# Patient Record
Sex: MALE | Race: WHITE
[De-identification: names, ages, dates, MRNs, and addresses within clinical notes are randomized per-mention and may not be internally consistent; named-entity substitution may affect disease eponyms.]

---

## 2017-10-04 ENCOUNTER — HOSPITAL ENCOUNTER (INPATIENT)
Dept: HOSPITAL 84 - D.ER | Age: 37
LOS: 1 days | Discharge: HOME | DRG: 918 | End: 2017-10-05
Attending: FAMILY MEDICINE | Admitting: FAMILY MEDICINE
Payer: COMMERCIAL

## 2017-10-04 VITALS — BODY MASS INDEX: 22.9 KG/M2

## 2017-10-04 DIAGNOSIS — T42.4X2A: ICD-10-CM

## 2017-10-04 DIAGNOSIS — K75.9: ICD-10-CM

## 2017-10-04 DIAGNOSIS — E86.0: ICD-10-CM

## 2017-10-04 DIAGNOSIS — Z72.0: ICD-10-CM

## 2017-10-04 DIAGNOSIS — T43.622A: Primary | ICD-10-CM

## 2017-10-04 DIAGNOSIS — F13.229: ICD-10-CM

## 2017-10-04 DIAGNOSIS — K21.9: ICD-10-CM

## 2017-10-04 DIAGNOSIS — F15.229: ICD-10-CM

## 2017-10-04 LAB
ALBUMIN SERPL-MCNC: 3.7 G/DL (ref 3.4–5)
ALP SERPL-CCNC: 42 U/L (ref 46–116)
ALT SERPL-CCNC: 70 U/L (ref 10–68)
AMORPHOUS SEDIMENT: (no result) /LPF
AMPHETAMINES UR QL SCN: POSITIVE QUAL
ANION GAP SERPL CALC-SCNC: 13.5 MMOL/L (ref 8–16)
APAP SERPL-MCNC: 0 UG/ML (ref 10–30)
APPEARANCE UR: CLEAR
BACTERIA #/AREA URNS HPF: (no result) /HPF
BARBITURATES UR QL SCN: NEGATIVE QUAL
BASOPHILS NFR BLD AUTO: 0.3 % (ref 0–2)
BENZODIAZ UR QL SCN: POSITIVE QUAL
BILIRUB SERPL-MCNC: 0.5 MG/DL (ref 0.2–1.3)
BILIRUB SERPL-MCNC: NEGATIVE MG/DL
BUN SERPL-MCNC: 34 MG/DL (ref 7–18)
BZE UR QL SCN: NEGATIVE QUAL
CALCIUM SERPL-MCNC: 9.1 MG/DL (ref 8.5–10.1)
CANNABINOIDS UR QL SCN: NEGATIVE QUAL
CHLORIDE SERPL-SCNC: 106 MMOL/L (ref 98–107)
CO2 SERPL-SCNC: 26.6 MMOL/L (ref 21–32)
COLOR UR: YELLOW
CREAT SERPL-MCNC: 1 MG/DL (ref 0.6–1.3)
EOSINOPHIL NFR BLD: 1.9 % (ref 0–7)
ERYTHROCYTE [DISTWIDTH] IN BLOOD BY AUTOMATED COUNT: 11.8 % (ref 11.5–14.5)
ETHANOL SERPL-MCNC: 1 MG/DL (ref 0–10)
GLOBULIN SER-MCNC: 3.8 G/L
GLUCOSE SERPL-MCNC: 94 MG/DL (ref 74–106)
GLUCOSE SERPL-MCNC: NEGATIVE MG/DL
HCT VFR BLD CALC: 37.5 % (ref 42–54)
HGB BLD-MCNC: 13.3 G/DL (ref 13.5–17.5)
IMM GRANULOCYTES NFR BLD: 0.2 % (ref 0–5)
KETONES UR STRIP-MCNC: (no result) MG/DL
LYMPHOCYTES NFR BLD AUTO: 14.6 % (ref 15–50)
MAGNESIUM SERPL-MCNC: 2.2 MG/DL (ref 1.8–2.4)
MCH RBC QN AUTO: 32 PG (ref 26–34)
MCHC RBC AUTO-ENTMCNC: 35.5 G/DL (ref 31–37)
MCV RBC: 90.4 FL (ref 80–100)
MONOCYTES NFR BLD: 10.8 % (ref 2–11)
NEUTROPHILS NFR BLD AUTO: 72.2 % (ref 40–80)
NITRITE UR-MCNC: NEGATIVE MG/ML
OPIATES UR QL SCN: NEGATIVE QUAL
OSMOLALITY SERPL CALC.SUM OF ELEC: 292 MOSM/KG (ref 275–300)
PCP UR QL SCN: NEGATIVE QUAL
PH UR STRIP: 5 [PH] (ref 5–6)
PLATELET # BLD: 229 10X3/UL (ref 130–400)
PMV BLD AUTO: 9.7 FL (ref 7.4–10.4)
POTASSIUM SERPL-SCNC: 3.1 MMOL/L (ref 3.5–5.1)
PROT SERPL-MCNC: 7.5 G/DL (ref 6.4–8.2)
PROT UR-MCNC: NEGATIVE MG/DL
RBC # BLD AUTO: 4.15 10X6/UL (ref 4.2–6.1)
SODIUM SERPL-SCNC: 143 MMOL/L (ref 136–145)
SP GR UR STRIP: 1.03 (ref 1–1.02)
UROBILINOGEN UR-MCNC: NORMAL MG/DL
WBC # BLD AUTO: 11 10X3/UL (ref 4.8–10.8)

## 2017-10-05 VITALS — SYSTOLIC BLOOD PRESSURE: 113 MMHG | DIASTOLIC BLOOD PRESSURE: 76 MMHG

## 2017-10-05 VITALS — DIASTOLIC BLOOD PRESSURE: 81 MMHG | SYSTOLIC BLOOD PRESSURE: 124 MMHG

## 2017-10-05 VITALS — SYSTOLIC BLOOD PRESSURE: 121 MMHG | DIASTOLIC BLOOD PRESSURE: 77 MMHG

## 2017-10-05 VITALS — DIASTOLIC BLOOD PRESSURE: 76 MMHG | SYSTOLIC BLOOD PRESSURE: 113 MMHG

## 2017-10-05 VITALS — DIASTOLIC BLOOD PRESSURE: 74 MMHG | SYSTOLIC BLOOD PRESSURE: 124 MMHG

## 2017-10-05 VITALS — DIASTOLIC BLOOD PRESSURE: 88 MMHG | SYSTOLIC BLOOD PRESSURE: 127 MMHG

## 2017-10-05 VITALS — DIASTOLIC BLOOD PRESSURE: 85 MMHG | SYSTOLIC BLOOD PRESSURE: 113 MMHG

## 2017-10-05 VITALS — SYSTOLIC BLOOD PRESSURE: 110 MMHG | DIASTOLIC BLOOD PRESSURE: 90 MMHG

## 2017-10-05 VITALS — DIASTOLIC BLOOD PRESSURE: 87 MMHG | SYSTOLIC BLOOD PRESSURE: 120 MMHG

## 2017-10-05 VITALS — DIASTOLIC BLOOD PRESSURE: 58 MMHG | SYSTOLIC BLOOD PRESSURE: 94 MMHG

## 2017-10-05 NOTE — NUR
DISCUSSED DISCHARGE INSTRUCTION WITH PT AND PT'S FATHER. THEY VOICED
UNDERSTANDING. NO S/S OF DISTRESS NOTED. PT REPORTS HE HAS ALL BELONGINGS.
DRESSED. LEFT FA PIV D/C'D WITH CATH TIP INTACT. PT TOLERATED WELL.

## 2017-10-05 NOTE — NUR
NUTRITION F/U
CHART REVIEWED. PT AT LOW NUTRITIONAL RISK. CHANGED DIET TO REG AS NO CURRENT
INDICATION FOR AHA DIET.
RD FOLLOWING

## 2017-10-05 NOTE — NUR
ASSESSMENT COMPLETED. PT RESTING IN BED WITH EYES CLOSED. NO NEEDS AT THIS
TIME. CALL LIGHT WITHIN REACH. VSS.

## 2017-10-05 NOTE — NUR
Is the patient Alert and Oriented? Yes  0
* How many steps to enter\exit or inside your home? 0  0
* PCP [facility]: Rosamaria Roberts  0
* Pharmacy [facility] [address]  0
* Preadmission Environment Home with Family  0
* ADLs Independent  0
* Equipment None  0
* List name and contact numbers for known caregivers / representatives who
currently or will assist patient after discharge: PARENTS: AGUSTO GUILLERMO
[phone]  0
* Community resources currently utilized None  0
* Additional services required to return to the preadmission environment? No
0
* Can the patient safely return to the preadmission environment? Yes  0
* Has this patient been hospitalized within the prior 30 days at any hospital?
No
 PATIENT IS EASILY ARROUSABLE AND ALERT. HE ANSWERS MY QUESTIONS WITHOUT
DIFFICULTY. PATIENT STATES HE WAS NOT TRYING TO HURT HIMSELF OR COMMIT
SUICIDE. HE STATES THAT HE HAD DONE SOME METH AND TOOK THE BENZO'S TO TRY TO
CALM HIMSELF DOWN. PATIENT STATES HE WAS LIVING AT HOME WITH HIS PARENTS BUT
RECENTLY MOVED OUT AND HAS BEEN STAYING WITH SOME FRIENDS. HIS PCP IS [facility], ROSAMARIA ROBERTS. PATIENT STATES HE GETS HIS MEDICATION FROM
[facility] [address]. PATIENT DENIES EVER HAVING HOME HEALTH AND DENIES USE OF
ANY DME. PATIENT STATES AT DISCHARGE HIS PARENTS WOULD DRIVE HIM HOME AND HE
HOPE HE WILL RETURN TO STAY WITH THEM. NO DISCHARGE NEEDS AT THIS TIME.

## 2017-10-05 NOTE — NUR
SPOKE WITH PATIENT REGARDING INPATIENT DRUG REHAB. HE STATES THAT HE HAS BEEN
TO [facility] IN THE PAST AND IS CURRENTLY ON SUBOXONE THAT HE GETS FROM
[facility]. PATIENT IS AWARE THAT HE WILL NEED TO CALL AND SPEAK TO [facility] HIMSELF TO SET UP HIS REHAB. HIS NURSE, ELISA STATES THAT SHE SPOKE
WITH PATIENTS FATHER AND HE IS WILLING TO COME PICK PATIENT UP AND LET PATIENT
STAY WITH THEM UNTIL HE CAN GET INTO [facility] WHICH WILL BE 24-48 HOURS
AFTER HE CALLS THEM AND PROVIDES HIS INFORMATION. PATIENT WILL BE DISCHARGED
HOME.

## 2017-10-05 NOTE — NUR
ASSESSMENT COMPLETE. S1S2. NSR SHOWING ON MONITOR. RR EQUAL AND UNLABORED.
SCABS/SORES GENERALIZED. SCAB/SORE TO RIGHT HEAD. PT STATES HX OF STAPH, MRSA,
AND POSSIBLE HEP C. STATES PREVIOUS AND CURRENT IV AMPHETAMINES; STATES WAS
KICKED OUT OF HOUSE, NOW HOMELESS, HASN'T SLEEP IN 8 DAYS AND NEEDED SLEEP;
WAS IRRITABLE.  WERE CALLED BY [facility], PT TRANSFERRED BY EMS; PER PT.
BOWEL SOUNDS ACTIVE X4. PERRLA. PT RESTLESS, COOPERATIVE, AND CALM BEHAVIOR.
PLEASANT CONVERSATION; RESPECTFUL.

## 2017-10-05 NOTE — NUR
PT CALLED [facility] AND GAVE THEIR ADMISSIONS DEPARTMENT HIS INFORMATION. WILL
HAVE TO FOLLOW UP ONCE DISCHARGED FROM HOSPITAL. PT'S FATHER CALLED AND
NOTIFIED OF PT'S DISHARGE WITH INTENT TO FOLLOW UP WITH [facility].

## 2017-10-06 NOTE — CN
PATIENT NAME:GILDA GUILLERMO                                MEDICAL RECORD: [id number]
: 76                                              LOCATION:[id number]
ADMIT DATE: 10/04/17                                       ACCOUNT: [id number]
CONSULTING PHYSICIAN:    NELLY PENNINGTON MD             
                                               
REFERRING PHYSICIAN:     EARNEST FERREIRA MD               
 
 
DATE OF CONSULTATION:  10/05/2017
 
PSYCHIATRIC CONSULTATION
 
IDENTIFYING DATA:  The patient is 37 years old and he is admitted to the
hospital on a voluntary basis.
 
CHIEF COMPLAINT:  Polysubstance abuse.
 
HISTORY OF PRESENT ILLNESS:  The patient has been using methamphetamine and
Xanax alternately for about a week.  He does not report any thoughts of harming
himself or others and does not report taking an overdose.  He was simply on an
8-day "meth run."  Adding to this problem; however, the patient is currently on
parole for drug-related offenses and is supposed to report to his 
this week.  He is denying suicidality or psychotic symptoms.  He is obviously
tired because he has not been sleeping appropriately.  The Xanax has been
something he has just been taking after he has used a great deal of
methamphetamine and this sort of a pattern that is characteristic of individuals
who abuse methamphetamine.  They use methamphetamine for a day or two and then
in order to calm down, relax, or even go to sleep, they will take Xanax.  The
primary drug of abuse though is certainly the methamphetamine, which the patient
admits to being his drug of choice.
 
MENTAL STATUS EXAMINATION:  The patient is awake, alert, and oriented to person,
place, time, and situation.  His mood is flat.  His affect is appropriate. 
Thought processes are goal directed.  Memory, concentration, and abstraction
abilities are intact.  He denies that he would seek to harm himself or others as
well as overt psychotic symptoms.
 
ASSESSMENT:  Methamphetamine abuse.
 
PLAN:  At this time, the patient is appropriate for inpatient residential
substance abuse treatment, which he says he is willing to consider.  I think
that in addition to this, it may not be necessary that he may likely be revoked
by his  and obviously he will be in a structured environment in
[facility] and will not be able to use drugs there and they also will provide some
group therapy and counseling for him.  I do not think he is acutely dangerous in
some direct way except for the fact that he uses methamphetamine, which
obviously is a foolish and dangerous thing to do, but he simply is out of
control because of his addiction.  Residential treatment is very appropriate and
I see no contraindications to him being placed in residential treatment.  He has
been to residential treatment before and it may be difficult to find a place
that will accept him again.
 
TRANSINT:[id number] Voice Confirmation ID: [id number] DOCUMENT ID: [id number]
 
 
 
CONSULT REPORT                                 [id number]    GILDA GUILLERMO, NELLY RAGSDALE             
 
 
 
Electronically Signed by NELLY PENNINGTON on 10/06/17 at 1709
 
 
 
 
 
 
 
 
 
 
 
 
 
 
 
 
 
 
 
 
 
 
 
 
 
 
 
 
 
 
 
 
 
 
 
 
 
 
 
 
 
CC:                                                             [id number]
DICTATION DATE: 10/05/17 1332     :     10/05/17 1428      DIS IN  
                                                                      10/05/17
[facility]                                          
[address] [place] [address]

## 2017-12-12 ENCOUNTER — HOSPITAL ENCOUNTER (EMERGENCY)
Dept: HOSPITAL 84 - D.ER | Age: 37
Discharge: HOME | End: 2017-12-12
Payer: COMMERCIAL

## 2017-12-12 DIAGNOSIS — F17.200: ICD-10-CM

## 2017-12-12 DIAGNOSIS — R19.7: ICD-10-CM

## 2017-12-12 DIAGNOSIS — L70.9: Primary | ICD-10-CM

## 2018-08-06 ENCOUNTER — HOSPITAL ENCOUNTER (EMERGENCY)
Dept: HOSPITAL 84 - D.ER | Age: 38
Discharge: HOME | End: 2018-08-06
Payer: COMMERCIAL

## 2018-08-06 VITALS — BODY MASS INDEX: 20.78 KG/M2 | HEIGHT: 69 IN | WEIGHT: 140.29 LBS

## 2018-08-06 VITALS — SYSTOLIC BLOOD PRESSURE: 140 MMHG | DIASTOLIC BLOOD PRESSURE: 80 MMHG

## 2018-08-06 DIAGNOSIS — G40.909: ICD-10-CM

## 2018-08-06 DIAGNOSIS — M79.671: ICD-10-CM

## 2018-08-06 DIAGNOSIS — M79.672: Primary | ICD-10-CM

## 2018-08-06 DIAGNOSIS — F17.200: ICD-10-CM

## 2018-08-07 ENCOUNTER — HOSPITAL ENCOUNTER (EMERGENCY)
Dept: HOSPITAL 84 - D.ER | Age: 38
Discharge: HOME | End: 2018-08-07
Payer: COMMERCIAL

## 2018-08-07 VITALS — SYSTOLIC BLOOD PRESSURE: 125 MMHG | DIASTOLIC BLOOD PRESSURE: 72 MMHG

## 2018-08-07 VITALS
HEIGHT: 69 IN | BODY MASS INDEX: 17.77 KG/M2 | WEIGHT: 120 LBS | HEIGHT: 69 IN | BODY MASS INDEX: 17.77 KG/M2 | WEIGHT: 120 LBS

## 2018-08-07 DIAGNOSIS — F17.200: ICD-10-CM

## 2018-08-07 DIAGNOSIS — L03.114: ICD-10-CM

## 2018-08-07 DIAGNOSIS — G40.909: ICD-10-CM

## 2018-08-07 DIAGNOSIS — F15.129: Primary | ICD-10-CM

## 2018-08-07 LAB
ALBUMIN SERPL-MCNC: 4.2 G/DL (ref 3.4–5)
ALP SERPL-CCNC: 57 U/L (ref 46–116)
ALT SERPL-CCNC: 118 U/L (ref 10–68)
AMPHETAMINES UR QL SCN: POSITIVE QUAL
ANION GAP SERPL CALC-SCNC: 14.6 MMOL/L (ref 8–16)
APPEARANCE UR: (no result)
BACTERIA #/AREA URNS HPF: (no result) /HPF
BARBITURATES UR QL SCN: NEGATIVE QUAL
BASOPHILS NFR BLD AUTO: 0.1 % (ref 0–2)
BENZODIAZ UR QL SCN: NEGATIVE QUAL
BILIRUB SERPL-MCNC: 1.56 MG/DL (ref 0.2–1.3)
BILIRUB SERPL-MCNC: NEGATIVE MG/DL
BUN SERPL-MCNC: 46 MG/DL (ref 7–18)
BZE UR QL SCN: NEGATIVE QUAL
CALCIUM SERPL-MCNC: 9 MG/DL (ref 8.5–10.1)
CANNABINOIDS UR QL SCN: NEGATIVE QUAL
CHLORIDE SERPL-SCNC: 100 MMOL/L (ref 98–107)
CO2 SERPL-SCNC: 26.1 MMOL/L (ref 21–32)
COLOR UR: YELLOW
CREAT SERPL-MCNC: 1.1 MG/DL (ref 0.6–1.3)
EOSINOPHIL NFR BLD: 0.1 % (ref 0–7)
ERYTHROCYTE [DISTWIDTH] IN BLOOD BY AUTOMATED COUNT: 12.2 % (ref 11.5–14.5)
ETHANOL SERPL-MCNC: 0 MG/DL (ref 0–10)
GLOBULIN SER-MCNC: 4 G/L
GLUCOSE SERPL-MCNC: 100 MG/DL (ref 74–106)
GLUCOSE SERPL-MCNC: NEGATIVE MG/DL
HCT VFR BLD CALC: 39.1 % (ref 42–54)
HGB BLD-MCNC: 14.2 G/DL (ref 13.5–17.5)
IMM GRANULOCYTES NFR BLD: 0.3 % (ref 0–5)
KETONES UR STRIP-MCNC: (no result) MG/DL
LYMPHOCYTES NFR BLD AUTO: 19.3 % (ref 15–50)
MCH RBC QN AUTO: 32.6 PG (ref 26–34)
MCHC RBC AUTO-ENTMCNC: 36.3 G/DL (ref 31–37)
MCV RBC: 89.7 FL (ref 80–100)
MONOCYTES NFR BLD: 11.1 % (ref 2–11)
NEUTROPHILS NFR BLD AUTO: 69.1 % (ref 40–80)
NITRITE UR-MCNC: NEGATIVE MG/ML
OPIATES UR QL SCN: NEGATIVE QUAL
OSMOLALITY SERPL CALC.SUM OF ELEC: 285 MOSM/KG (ref 275–300)
PCP UR QL SCN: NEGATIVE QUAL
PH UR STRIP: 5 [PH] (ref 5–6)
PLATELET # BLD: 246 10X3/UL (ref 130–400)
PMV BLD AUTO: 9.5 FL (ref 7.4–10.4)
POTASSIUM SERPL-SCNC: 3.7 MMOL/L (ref 3.5–5.1)
PROT SERPL-MCNC: 8.2 G/DL (ref 6.4–8.2)
PROT UR-MCNC: (no result) MG/DL
RBC # BLD AUTO: 4.36 10X6/UL (ref 4.2–6.1)
RBC #/AREA URNS HPF: (no result) /HPF (ref 0–5)
SODIUM SERPL-SCNC: 137 MMOL/L (ref 136–145)
SP GR UR STRIP: 1.02 (ref 1–1.02)
SPERM #/AREA URNS HPF: (no result) /HPF
SQUAMOUS #/AREA URNS HPF: (no result) /HPF (ref 0–5)
UROBILINOGEN UR-MCNC: NORMAL MG/DL
WBC # BLD AUTO: 11.7 10X3/UL (ref 4.8–10.8)
WBC #/AREA URNS HPF: (no result) /HPF (ref 0–5)

## 2019-06-19 ENCOUNTER — HOSPITAL ENCOUNTER (INPATIENT)
Dept: HOSPITAL 84 - D.ER | Age: 39
LOS: 2 days | Discharge: HOME | DRG: 557 | End: 2019-06-21
Attending: INTERNAL MEDICINE | Admitting: INTERNAL MEDICINE
Payer: COMMERCIAL

## 2019-06-19 VITALS — BODY MASS INDEX: 25.23 KG/M2 | WEIGHT: 170.36 LBS | HEIGHT: 69 IN

## 2019-06-19 VITALS — DIASTOLIC BLOOD PRESSURE: 80 MMHG | SYSTOLIC BLOOD PRESSURE: 150 MMHG

## 2019-06-19 VITALS — SYSTOLIC BLOOD PRESSURE: 133 MMHG | DIASTOLIC BLOOD PRESSURE: 88 MMHG

## 2019-06-19 VITALS — SYSTOLIC BLOOD PRESSURE: 115 MMHG | DIASTOLIC BLOOD PRESSURE: 71 MMHG

## 2019-06-19 DIAGNOSIS — R40.2343: ICD-10-CM

## 2019-06-19 DIAGNOSIS — R40.2233: ICD-10-CM

## 2019-06-19 DIAGNOSIS — R40.2123: ICD-10-CM

## 2019-06-19 DIAGNOSIS — F15.10: ICD-10-CM

## 2019-06-19 DIAGNOSIS — N17.9: ICD-10-CM

## 2019-06-19 DIAGNOSIS — E87.1: ICD-10-CM

## 2019-06-19 DIAGNOSIS — M62.82: Primary | ICD-10-CM

## 2019-06-19 DIAGNOSIS — F17.213: ICD-10-CM

## 2019-06-19 DIAGNOSIS — I10: ICD-10-CM

## 2019-06-19 LAB
ALBUMIN SERPL-MCNC: 3.7 G/DL (ref 3.4–5)
ALP SERPL-CCNC: 47 U/L (ref 46–116)
ALT SERPL-CCNC: 81 U/L (ref 10–68)
ANION GAP SERPL CALC-SCNC: 15.3 MMOL/L (ref 8–16)
BASOPHILS NFR BLD AUTO: 0.2 % (ref 0–2)
BILIRUB SERPL-MCNC: 1.09 MG/DL (ref 0.2–1.3)
BUN SERPL-MCNC: 30 MG/DL (ref 7–18)
CALCIUM SERPL-MCNC: 8.7 MG/DL (ref 8.5–10.1)
CHLORIDE SERPL-SCNC: 98 MMOL/L (ref 98–107)
CK MB SERPL-MCNC: 32.4 U/L (ref 0–3.6)
CK SERPL-CCNC: 4036 UL (ref 21–232)
CO2 SERPL-SCNC: 25.3 MMOL/L (ref 21–32)
CREAT SERPL-MCNC: 1.3 MG/DL (ref 0.6–1.3)
EOSINOPHIL NFR BLD: 0.7 % (ref 0–7)
ERYTHROCYTE [DISTWIDTH] IN BLOOD BY AUTOMATED COUNT: 11.9 % (ref 11.5–14.5)
GLOBULIN SER-MCNC: 4.1 G/L
GLUCOSE SERPL-MCNC: 103 MG/DL (ref 74–106)
HCT VFR BLD CALC: 39.7 % (ref 42–54)
HGB BLD-MCNC: 14.5 G/DL (ref 13.5–17.5)
IMM GRANULOCYTES NFR BLD: 0.4 % (ref 0–5)
LYMPHOCYTES NFR BLD AUTO: 16.6 % (ref 15–50)
MCH RBC QN AUTO: 32.2 PG (ref 26–34)
MCHC RBC AUTO-ENTMCNC: 36.5 G/DL (ref 31–37)
MCV RBC: 88.2 FL (ref 80–100)
MONOCYTES NFR BLD: 12.4 % (ref 2–11)
NEUTROPHILS NFR BLD AUTO: 69.7 % (ref 40–80)
OSMOLALITY SERPL CALC.SUM OF ELEC: 275 MOSM/KG (ref 275–300)
PLATELET # BLD: 209 10X3/UL (ref 130–400)
PMV BLD AUTO: 9.7 FL (ref 7.4–10.4)
POTASSIUM SERPL-SCNC: 3.6 MMOL/L (ref 3.5–5.1)
PROT SERPL-MCNC: 7.8 G/DL (ref 6.4–8.2)
RBC # BLD AUTO: 4.5 10X6/UL (ref 4.2–6.1)
SODIUM SERPL-SCNC: 135 MMOL/L (ref 136–145)
WBC # BLD AUTO: 10.5 10X3/UL (ref 4.8–10.8)

## 2019-06-19 NOTE — NUR
DR PENNINGTON NOTIFIED AND REVIEWED PT BEHAVIOR AND ASSESSMENT RESULTS. PT IS A
LOW RISK PER DR CALDERÓN. DR PENNINGTON STATED TO GIVE RESOURCES TO PT AT TIME OF
DISCHARGE. NO FURTHER ORDER AT THIS TIME. RESOURCES REVIEWED WITH PT AND HE
VERBALIZED UNDERSTANDING.

## 2019-06-19 NOTE — NUR
PATIENT TO ROOM AT THIS TIME WITH VS STABLE. PATIENT ASLEEP AND SNORING. NO
COMPLAINTS OR SIGNS OF DISTRESS. CALL LIGHT WITHIN REACH. WILL CONTINUE TO
MONITOR.

## 2019-06-19 NOTE — NUR
REC'D AT [facility] OF SHIFT LYING ON STOMACH EYES CLOSED RESP. DEEP AND EVEN.WILL
CONTINUE TO MONITOR FOR ANY CHGES. AND FOLLOW CURRENT PLAN OF CARE.

## 2019-06-19 NOTE — NUR
PATIENT STILL IN BED SLEEPING WITH NO COMPLAINTS OR SIGNS OF DISTRESS. IV
INTACT. TELE 90 SR. CALL LIGHT WITHIN REACH. home

## 2019-06-19 NOTE — NUR
PT CONTINUES TO BE ALL OVER THE PLACE, PULLED OUT IV, IVF'S ALL OVER THE FLOOR.
 PT PACING IN ROOM AND DIFFICULT TO REDIRECT BACK TO BED.  PMD NOTIFIED AND NEW
ORDERS RCVD

## 2019-06-19 NOTE — NUR
TRANSPORTED TO ROOM #2223 VIA STRETCHER WITH 2 RN'S.  CONDITION STABLE.  NS
INFUSING TO LEFT FA WITHOUT PBMS

## 2019-06-19 NOTE — NUR
PATIENT VS WNL. TAKEN BY CNA AT THIS TIME. PATIENT OPENED HIS EYES AND MUMBLED
WHEN SHE WAS GETTING VS. STILL SLEEPING OTHERWISE, VS STABLE. IV INTACT. CALL
LIGHT WITHIN REACH.

## 2019-06-19 NOTE — NUR
ASSUMED CARE OF PT.  PT ALL OVER THE PLACE/CAN'T BE STILL.  LEGS THRASHING
CONSTANTLY.  SIDERAILS PADDED AND MEDS GIVEN PER ORDERS.

## 2019-06-19 NOTE — NUR
DR PENNINGTON NOTIFIED AND 1:1 SITTER OBSERVATION ORDERED. SITTER AT BEDSIDE.
NOTIFIED CHARGE NURSE AND ATTENDING IN REGARDS TO ASSESSMENT FINDINGS.
RESOURCES GIVEN TO PT AND SAFETY PLAN INITIATED.

## 2019-06-19 NOTE — NUR
PATIENT IN BED WITH EYES CLOSED SNORING. NO COMPLAINTS OR SIGNS OF DISTRESS.
IV INTACT. CALL LIGHT WITHIN REACH.

## 2019-06-20 VITALS — SYSTOLIC BLOOD PRESSURE: 154 MMHG | DIASTOLIC BLOOD PRESSURE: 76 MMHG

## 2019-06-20 VITALS — DIASTOLIC BLOOD PRESSURE: 97 MMHG | SYSTOLIC BLOOD PRESSURE: 131 MMHG

## 2019-06-20 VITALS — DIASTOLIC BLOOD PRESSURE: 76 MMHG | SYSTOLIC BLOOD PRESSURE: 120 MMHG

## 2019-06-20 VITALS — SYSTOLIC BLOOD PRESSURE: 147 MMHG | DIASTOLIC BLOOD PRESSURE: 95 MMHG

## 2019-06-20 VITALS — DIASTOLIC BLOOD PRESSURE: 82 MMHG | SYSTOLIC BLOOD PRESSURE: 148 MMHG

## 2019-06-20 LAB
ALBUMIN SERPL-MCNC: 2.9 G/DL (ref 3.4–5)
ALP SERPL-CCNC: 37 U/L (ref 46–116)
ALT SERPL-CCNC: 67 U/L (ref 10–68)
AMPHETAMINES UR QL SCN: POSITIVE QUAL
ANION GAP SERPL CALC-SCNC: 13.6 MMOL/L (ref 8–16)
APPEARANCE UR: CLEAR
BARBITURATES UR QL SCN: NEGATIVE QUAL
BASOPHILS NFR BLD AUTO: 0.2 % (ref 0–2)
BENZODIAZ UR QL SCN: NEGATIVE QUAL
BILIRUB SERPL-MCNC: 0.67 MG/DL (ref 0.2–1.3)
BILIRUB SERPL-MCNC: NEGATIVE MG/DL
BUN SERPL-MCNC: 16 MG/DL (ref 7–18)
BZE UR QL SCN: NEGATIVE QUAL
CALCIUM SERPL-MCNC: 8.1 MG/DL (ref 8.5–10.1)
CANNABINOIDS UR QL SCN: NEGATIVE QUAL
CHLORIDE SERPL-SCNC: 108 MMOL/L (ref 98–107)
CK MB SERPL-MCNC: 9.2 U/L (ref 0–3.6)
CK SERPL-CCNC: 1905 UL (ref 21–232)
CO2 SERPL-SCNC: 26.9 MMOL/L (ref 21–32)
COLOR UR: YELLOW
CREAT SERPL-MCNC: 0.8 MG/DL (ref 0.6–1.3)
EOSINOPHIL NFR BLD: 2.8 % (ref 0–7)
ERYTHROCYTE [DISTWIDTH] IN BLOOD BY AUTOMATED COUNT: 12.3 % (ref 11.5–14.5)
GLOBULIN SER-MCNC: 3.5 G/L
GLUCOSE SERPL-MCNC: 73 MG/DL (ref 74–106)
GLUCOSE SERPL-MCNC: NEGATIVE MG/DL
HCT VFR BLD CALC: 38.8 % (ref 42–54)
HGB BLD-MCNC: 13.9 G/DL (ref 13.5–17.5)
IMM GRANULOCYTES NFR BLD: 0.2 % (ref 0–5)
KETONES UR STRIP-MCNC: (no result) MG/DL
LYMPHOCYTES NFR BLD AUTO: 30.7 % (ref 15–50)
MCH RBC QN AUTO: 32.3 PG (ref 26–34)
MCHC RBC AUTO-ENTMCNC: 35.8 G/DL (ref 31–37)
MCV RBC: 90.2 FL (ref 80–100)
MONOCYTES NFR BLD: 9.8 % (ref 2–11)
NEUTROPHILS NFR BLD AUTO: 56.3 % (ref 40–80)
NITRITE UR-MCNC: NEGATIVE MG/ML
OPIATES UR QL SCN: NEGATIVE QUAL
OSMOLALITY SERPL CALC.SUM OF ELEC: 288 MOSM/KG (ref 275–300)
PCP UR QL SCN: NEGATIVE QUAL
PH UR STRIP: 5 [PH] (ref 5–6)
PLATELET # BLD: 190 10X3/UL (ref 130–400)
PMV BLD AUTO: 10.1 FL (ref 7.4–10.4)
POTASSIUM SERPL-SCNC: 3.5 MMOL/L (ref 3.5–5.1)
PROT SERPL-MCNC: 6.4 G/DL (ref 6.4–8.2)
PROT UR-MCNC: NEGATIVE MG/DL
RBC # BLD AUTO: 4.3 10X6/UL (ref 4.2–6.1)
SODIUM SERPL-SCNC: 145 MMOL/L (ref 136–145)
SP GR UR STRIP: 1.01 (ref 1–1.02)
UROBILINOGEN UR-MCNC: NORMAL MG/DL
WBC # BLD AUTO: 6 10X3/UL (ref 4.8–10.8)

## 2019-06-20 NOTE — NUR
PATIENT IN BED WITH IV INTACT. EYES CLOSED RESTING QUIETLY. NO COMPLAINTS AT
THIS TIME. CALL LIGHT WITHIN REACH

## 2019-06-20 NOTE — NUR
NOTIFIED DR. ROBERTSON THAT PATIENT IS WANTING PAIN MEDS FOR MOUTH AND NEURONTIN
RESTARTED. NEW ORDERS RECIEVED AND CARRIED OUT.

## 2019-06-20 NOTE — NUR
PATIENT IN BED WITH IV INTACT. MAD BECAUSE HE ONLY GOT TYLENOL AND ONCOLOGY
MOUTH WASH FOR PAIN. EXPLAINED TO PATIENT THAT THAT IS WHAT PHYSICIAN ORDERED.
CALL LIGHT WITHIN REACH.

## 2019-06-21 VITALS — SYSTOLIC BLOOD PRESSURE: 138 MMHG | DIASTOLIC BLOOD PRESSURE: 90 MMHG

## 2019-06-21 VITALS — SYSTOLIC BLOOD PRESSURE: 133 MMHG | DIASTOLIC BLOOD PRESSURE: 85 MMHG

## 2019-06-21 VITALS — DIASTOLIC BLOOD PRESSURE: 98 MMHG | SYSTOLIC BLOOD PRESSURE: 145 MMHG

## 2019-06-21 VITALS — DIASTOLIC BLOOD PRESSURE: 89 MMHG | SYSTOLIC BLOOD PRESSURE: 153 MMHG

## 2019-06-21 LAB
ANION GAP SERPL CALC-SCNC: 8.7 MMOL/L (ref 8–16)
BASOPHILS NFR BLD AUTO: 0.4 % (ref 0–2)
BUN SERPL-MCNC: 8 MG/DL (ref 7–18)
CALCIUM SERPL-MCNC: 8.1 MG/DL (ref 8.5–10.1)
CHLORIDE SERPL-SCNC: 113 MMOL/L (ref 98–107)
CK MB SERPL-MCNC: 3.5 U/L (ref 0–3.6)
CK SERPL-CCNC: 818 UL (ref 21–232)
CO2 SERPL-SCNC: 28.8 MMOL/L (ref 21–32)
CREAT SERPL-MCNC: 0.6 MG/DL (ref 0.6–1.3)
EOSINOPHIL NFR BLD: 3.3 % (ref 0–7)
ERYTHROCYTE [DISTWIDTH] IN BLOOD BY AUTOMATED COUNT: 12.2 % (ref 11.5–14.5)
GLUCOSE SERPL-MCNC: 90 MG/DL (ref 74–106)
HCT VFR BLD CALC: 36.7 % (ref 42–54)
HCV AB S/CO SERPL IA: >11 S/CO RAT (ref 0–0.9)
HGB BLD-MCNC: 12.9 G/DL (ref 13.5–17.5)
IMM GRANULOCYTES NFR BLD: 0.2 % (ref 0–5)
LYMPHOCYTES NFR BLD AUTO: 34.6 % (ref 15–50)
MCH RBC QN AUTO: 31.7 PG (ref 26–34)
MCHC RBC AUTO-ENTMCNC: 35.1 G/DL (ref 31–37)
MCV RBC: 90.2 FL (ref 80–100)
MONOCYTES NFR BLD: 9.1 % (ref 2–11)
NEUTROPHILS NFR BLD AUTO: 52.4 % (ref 40–80)
OSMOLALITY SERPL CALC.SUM OF ELEC: 289 MOSM/KG (ref 275–300)
PLATELET # BLD: 182 10X3/UL (ref 130–400)
PMV BLD AUTO: 9.6 FL (ref 7.4–10.4)
POTASSIUM SERPL-SCNC: 3.5 MMOL/L (ref 3.5–5.1)
RBC # BLD AUTO: 4.07 10X6/UL (ref 4.2–6.1)
SODIUM SERPL-SCNC: 147 MMOL/L (ref 136–145)
WBC # BLD AUTO: 4.6 10X3/UL (ref 4.8–10.8)

## 2019-06-21 NOTE — MORECARE
CASE MANAGEMENT DISCHARGE SUMMARY
 
 
PATIENT: GILDA GUILLERMO                      UNIT: [id number]
ACCOUNT#: [id number]                       ADM DATE: 19
AGE: 38     : [birth year]  SEX: M            ROOM/BED: D.2223    
AUTHOR: ARTI,DOC                             PHYSICIAN:                               
 
REFERRING PHYSICIAN: ESTHER ROBERTSON MD               
DATE OF SERVICE: 19
Discharge Plan
 
 
Patient Name: GILDA GUILLERMO
Facility: [facility]:[place]
Encounter #: [id number]
Medical Record #: [id number]
: 1980
Planned Disposition: Home
Anticipated Discharge Date: 19
 
Discharge Date: 
Expected LOS: 2
Initial Reviewer: [id number]
Initial Review Date: 2019
Generated: 19   2:20 pm 
Comments
 
DCP- Discharge Planning
 
Updated by [email or web address]: Marcela Collins on 19  12:15 pm CT
Patient Name: GILDA GUILLERMO                                     
Admission Status: ER   
Accout number: [id number]                              
Admission Date: 2019   
: 1980                                                        
Admission Diagnosis:   
Attending: ESTHER ROBERTSON                                                
Current LOS:  2   
  
Anticipated DC Date: 2019   
Planned Disposition: Home   
Primary Insurance: [employer]   
  
  
Discharge Planning Comments: Received discharge orders. CM met with patient 
concerning discharge needs. He states he is "in between places right now" but 
is planning on going to live with his mom and dad. He is independent with all 
ADL's. He is aware of [facility] and has been there before. He states his 
father will pick him up for discharge. CM will continue to follow and assist 
 
with discharge planning/needs.  
  
  
  
  
  
  
: Marcela Collins
 DCPIA - Discharge Planning Initial Assessment
 
Updated by [email or web address]: Marcela Collins on 19   1:13 pm
*  Is the patient Alert and Oriented?
Yes
*  PCP
Dr. Rios [facility]
*  Pharmacy
[facility] on [address]
*  Preadmission Environment
Home Alone
*  ADLs
Independent
*  Equipment
None
*  List name and contact numbers for known caregivers / representatives who 
currently or will assist patient after discharge:
Oscar Guillermo - father - [phone]
*  Verbal permission to speak to the caregivers and representatives has been 
obtained from the patient.
Yes
*  Community resources currently utilized
None
*  Additional services required to return to the preadmission environment?
No
*  Can the patient safely return to the preadmission environment?
Yes
*  Has this patient been hospitalized within the prior 30 days at any 
hospital?
No
 
 
 
 
 
 
 
Last DP export: 19  12:14 p
Patient Name: GILDA GUILLERMO
 
Encounter #: [id number]
Page [id number]
 
 
 
 
 
Electronically Signed by EITAN CHI on 19 at 1320
 
 
 
 
 
 
**All edits/amendments must be made on the electronic document**
 
DICTATION DATE: 19 132     : MIAN  19 1320     
RPT#: [id number]                                DC DATE:        
                                               STATUS: ADM IN  
[facility]
 [place] [address]
***END OF REPORT***

## 2019-06-21 NOTE — NUR
DISCHARGE INSTRUCTIONS GIVEN. SEEMS TO UNDERSTAND. IV OUT TIP INTACT. OFF
TELEMETRY. REFUSED WHEELCHAIR.LEFT WITH FAMILY MEMEBER TO PERSONAL RIDE TO GO
HOME.

## 2019-06-21 NOTE — MORECARE
CASE MANAGEMENT DISCHARGE SUMMARY
 
 
PATIENT: GILDA GUILLERMO                      UNIT: [id number]
ACCOUNT#: [id number]                       ADM DATE: 19
AGE: 38     : [birth year]  SEX: M            ROOM/BED: D.2223    
AUTHOR: EITAN CHI                             PHYSICIAN:                               
 
REFERRING PHYSICIAN: ESTHER ROBERTSON MD               
DATE OF SERVICE: 19
Discharge Plan
 
 
Patient Name: GILDA GUILLERMO
[facility]
Encounter #: [id number]
Medical Record #: [id number]
: 1980
Planned Disposition: Home
Anticipated Discharge Date: 19
 
Discharge Date: 
Expected LOS: 2
Initial Reviewer: [email or web address]
Initial Review Date: 2019
Generated: 19   2:13 pm 
 DCPIA - Discharge Planning Initial Assessment
 
Updated by [email or web address]: Marcela Collins on 19   1:13 pm
*  Is the patient Alert and Oriented?
Yes
*  PCP
Dr. Rios at [facility]
*  Pharmacy
[facility] on [address]
*  Preadmission Environment
Home Alone
*  ADLs
Independent
*  Equipment
None
*  List name and contact numbers for known caregivers / representatives who 
currently or will assist patient after discharge:
Oscar Guillermo - father - [phone]
*  Verbal permission to speak to the caregivers and representatives has been 
obtained from the patient.
Yes
*  Community resources currently utilized
None
*  Additional services required to return to the preadmission environment?
 
No
*  Can the patient safely return to the preadmission environment?
Yes
*  Has this patient been hospitalized within the prior 30 days at any 
hospital?
No
 
 
 
 
 
 
Patient Name: GILDA GUILLERMO
Encounter #: [id number]
Page [id number]
 
 
 
 
 
Electronically Signed by EITAN CHI on 19 at 1314
 
 
 
 
 
 
**All edits/amendments must be made on the electronic document**
 
DICTATION DATE: 19 1313     : MIAN  19 1313     
RPT#: [id number]                                DC DATE:        
                                               STATUS: ADM IN  
[facility]
 [place] [address]
***END OF REPORT***

## 2019-06-21 NOTE — NUR
PT RESTING IN BED. NO SIGNS OF DISTRESS. IV TO RIGHT HAND PATENT NO REDNESS OR
TENDERNESS. COMPLAINS OF PAIN MEDICATION GIVEN. DENIES ANY FURTHER NEED AT
THIS TIME. CALL LIGHT IN REACH. BED LOW POSITION. FAMILY AT BEDSIDE.

## 2019-06-21 NOTE — NUR
PT REQUESTED SOMETHING FOR SLEEP. RECEIVED ONE TIME ORDER FOR BENADRYL PER
NAYELI. GAVE BENADRY 50 MG PO. PT AMBULATED TO BATHROOM TO VOID. REPLACED LEADS
ON TELEMETRY. WILL CONTINUE TO MONITOR.